# Patient Record
(demographics unavailable — no encounter records)

---

## 2018-10-09 NOTE — MRI
BRAIN MRI WITHOUT CONTRAST:

 

INDICATIONS:

Migraine headaches, progressing in intensity.

 

COMPARISON:

No prior comparison.

 

FINDINGS:

The ventricular system is normal in size.  The septum pellucidum and third ventricle are maintained a
t midline.  No acute territorial infarction.  No intracranial hemorrhagic susceptibility.  Skull base
 flow voids are maintained.  There are no significant signal abnormalities of the brain parenchyma.  
The skull base flow voids are patent.  The paranasal sinuses are grossly clear.

 

IMPRESSION:

There are no acute intracranial abnormalities.

 

POS: [facility]